# Patient Record
Sex: FEMALE | Race: WHITE | HISPANIC OR LATINO | Employment: UNEMPLOYED | URBAN - METROPOLITAN AREA
[De-identification: names, ages, dates, MRNs, and addresses within clinical notes are randomized per-mention and may not be internally consistent; named-entity substitution may affect disease eponyms.]

---

## 2023-09-27 ENCOUNTER — HOSPITAL ENCOUNTER (EMERGENCY)
Facility: CLINIC | Age: 53
Discharge: HOME | End: 2023-09-27
Attending: EMERGENCY MEDICINE | Admitting: EMERGENCY MEDICINE

## 2023-09-27 VITALS
OXYGEN SATURATION: 98 % | DIASTOLIC BLOOD PRESSURE: 63 MMHG | RESPIRATION RATE: 16 BRPM | SYSTOLIC BLOOD PRESSURE: 106 MMHG | TEMPERATURE: 97 F | HEART RATE: 84 BPM

## 2023-09-27 DIAGNOSIS — T78.40XA ALLERGIC REACTION, INITIAL ENCOUNTER: ICD-10-CM

## 2023-09-27 PROCEDURE — 96375 TX/PRO/DX INJ NEW DRUG ADDON: CPT

## 2023-09-27 PROCEDURE — 96372 THER/PROPH/DIAG INJ SC/IM: CPT | Mod: 59

## 2023-09-27 PROCEDURE — 258N000003 HC RX IP 258 OP 636: Performed by: EMERGENCY MEDICINE

## 2023-09-27 PROCEDURE — 96361 HYDRATE IV INFUSION ADD-ON: CPT

## 2023-09-27 PROCEDURE — 99291 CRITICAL CARE FIRST HOUR: CPT | Mod: 25

## 2023-09-27 PROCEDURE — 96376 TX/PRO/DX INJ SAME DRUG ADON: CPT

## 2023-09-27 PROCEDURE — 96374 THER/PROPH/DIAG INJ IV PUSH: CPT

## 2023-09-27 PROCEDURE — 250N000011 HC RX IP 250 OP 636: Mod: JZ | Performed by: EMERGENCY MEDICINE

## 2023-09-27 PROCEDURE — 250N000009 HC RX 250: Performed by: EMERGENCY MEDICINE

## 2023-09-27 RX ORDER — DEXAMETHASONE SODIUM PHOSPHATE 10 MG/ML
10 INJECTION, SOLUTION INTRAMUSCULAR; INTRAVENOUS ONCE
Status: COMPLETED | OUTPATIENT
Start: 2023-09-27 | End: 2023-09-27

## 2023-09-27 RX ORDER — DIPHENHYDRAMINE HYDROCHLORIDE 50 MG/ML
25 INJECTION INTRAMUSCULAR; INTRAVENOUS ONCE
Status: COMPLETED | OUTPATIENT
Start: 2023-09-27 | End: 2023-09-27

## 2023-09-27 RX ORDER — DIPHENHYDRAMINE HCL 25 MG
25 TABLET ORAL EVERY 6 HOURS PRN
Refills: 0 | COMMUNITY
Start: 2023-09-27

## 2023-09-27 RX ORDER — EPINEPHRINE 0.3 MG/.3ML
0.3 INJECTION SUBCUTANEOUS
Qty: 2 EACH | Refills: 0 | Status: SHIPPED | OUTPATIENT
Start: 2023-09-27

## 2023-09-27 RX ADMIN — SODIUM CHLORIDE 1000 ML: 9 INJECTION, SOLUTION INTRAVENOUS at 19:33

## 2023-09-27 RX ADMIN — DEXAMETHASONE SODIUM PHOSPHATE 10 MG: 10 INJECTION, SOLUTION INTRAMUSCULAR; INTRAVENOUS at 19:39

## 2023-09-27 RX ADMIN — EPINEPHRINE 0.3 MG: 1 INJECTION INTRAMUSCULAR; INTRAVENOUS; SUBCUTANEOUS at 20:47

## 2023-09-27 RX ADMIN — DIPHENHYDRAMINE HYDROCHLORIDE 25 MG: 50 INJECTION, SOLUTION INTRAMUSCULAR; INTRAVENOUS at 19:39

## 2023-09-27 RX ADMIN — DIPHENHYDRAMINE HYDROCHLORIDE 25 MG: 50 INJECTION INTRAMUSCULAR; INTRAVENOUS at 20:44

## 2023-09-27 RX ADMIN — FAMOTIDINE 40 MG: 10 INJECTION, SOLUTION INTRAVENOUS at 19:39

## 2023-09-27 ASSESSMENT — ACTIVITIES OF DAILY LIVING (ADL): ADLS_ACUITY_SCORE: 35

## 2023-09-28 NOTE — ED PROVIDER NOTES
History     Chief Complaint:  No chief complaint on file.       The history is provided by the patient.      Kirstin Bingham is a 52 year old female who presents with an allergic reaction. She explains that she was eating today and knows that there was shrimp in it. She does not known of shrimp allergy but only different food.  Felt itchy rash nausea no vomting.  Throat sensation but no trouble breathing or wheeze or stridor.    Independent Historian:   None - Patient Only    Review of External Notes:   None      Medications:    The patient is not currently taking any prescribed medications.    Past Medical History:    The patient denies a past medical history.     Physical Exam   Patient Vitals for the past 24 hrs:   BP Temp Temp src Pulse Resp SpO2   09/27/23 2100 110/68 -- -- 91 21 100 %   09/27/23 2045 97/66 -- -- 88 17 100 %   09/27/23 2030 110/68 -- -- 85 (!) 9 100 %   09/27/23 2000 110/70 -- -- 76 14 100 %   09/27/23 1945 112/73 -- -- 74 27 99 %   09/27/23 1930 99/73 -- -- 85 23 98 %   09/27/23 1923 (!) 79/52 97  F (36.1  C) Temporal 80 26 98 %        Physical Exam  General: Patient is well appearing. No distress.  Head: Atraumatic.  Eyes: Conjunctivae and EOM are normal. No scleral icterus.  Neck: Normal range of motion. Neck supple.    Cardiovascular: Normal rate, regular rhythm, normal heart sounds and intact distal pulses.   Pulmonary/Chest: Breath sounds normal. No wheezing. No stridor. No swelling.  Abdominal: Soft. Bowel sounds are normal. No distension. No tenderness. No rebound or guarding.   Musculoskeletal: Normal range of motion.  Skin: Warm and dry. Not diaphoretic. Urticaria hives type rash on arms and face slightly.     Emergency Department Course     Emergency Department Course & Assessments:       Interventions:  Medications   sodium chloride 0.9% BOLUS 1,000 mL (1,000 mLs Intravenous $New Bag 9/27/23 1933)   diphenhydrAMINE (BENADRYL) injection 25 mg (25 mg Intravenous $Given 9/27/23  1939)   famotidine (PEPCID) injection 40 mg (40 mg Intravenous $Given 9/27/23 1939)   dexAMETHasone PF (DECADRON) injection 10 mg (10 mg Intravenous $Given 9/27/23 1939)   EPINEPHrine (ADRENALIN) kit 0.3 mg (0.3 mg Subcutaneous $Given 9/27/23 2047)   diphenhydrAMINE (BENADRYL) injection 25 mg (25 mg Intravenous $Given 9/27/23 2044)        Assessments:  1926 I obtained history and examined the patient as noted above.  2002 I reevaluated and the patient was feeling improved.   2219 I rechecked the patient and explained findings. We discussed plan for discharge and patient is in agreement with plan.     Independent Interpretation (X-rays, CTs, rhythm strip):  None    Consultations/Discussion of Management or Tests:  None      Social Determinants of Health affecting care:       Disposition:  The patient was discharged to home.     Impression & Plan    CMS Diagnoses: None    Medical Decision Making:  This pt presented with signs/sxs consistent with allergic reaction.  They were given Benadryl and Zantac decadorn and observed with complete resolution then slight return of itching and rash so epi was given and 2nd dose of benadryl.  After prolonged observation for that no return of sx.  There was no lip/tongue/throat involvement, SOB, CP, lightheadedness/syncope to suggest more-severe allergic reaction and therefore no further epinephrine was not used. Discusse dlikley shrimp allergy  At this point there are no signs of worsening, the pt is HD stable and without signs of respiratory involvement -- I believe the patient is safe for discharge home.  They were discharged with the recommendation to use Benadryl Q6hrs prn return of allergy sxs and to emergently return to the ED if they develop lip/tongue/throat involvement, SOB, CP, lightheadedness/syncope, or other new and concerning sxs. Recommended follow-up with PMD in 1-2 days for persistent symptoms.  Epipen prescription given as well. Pt expresses understanding and  agreement with the plan. All questions and concerns were answered. The patient was discharged home and recommended to follow up with his primary physician and return with any new or worsening symptoms.    Diagnosis:    ICD-10-CM    1. Allergic reaction, initial encounter  T78.40XA            Discharge Medications:  New Prescriptions    DIPHENHYDRAMINE (BENADRYL) 25 MG TABLET    Take 1 tablet (25 mg) by mouth every 6 hours as needed for itching or allergies    EPINEPHRINE (ANY BX GENERIC EQUIV) 0.3 MG/0.3ML INJECTION 2-PACK    Inject 0.3 mLs (0.3 mg) into the muscle once as needed for anaphylaxis May repeat one time in 5-15 minutes if response to initial dose is inadequate.          Scribe Disclosure:  I, Damián Watts, am serving as a scribe at 7:35 PM on 9/27/2023 to document services personally performed by Raheem Vinson MD based on my observations and the provider's statements to me.   9/27/2023   Raheem Vinson MD Stevens, Andrew C, MD  09/27/23 1134

## 2023-09-28 NOTE — ED NOTES
Symptoms resolving. Less redness and swelling of face. Reports the swelling in the throat has improved

## 2023-09-28 NOTE — ED TRIAGE NOTES
Presents to triage with c/o allergic reaction. Patient ate shrimp around 1530 and is now having difficulty breathing, numbness of the tongue and lips, throat swelling